# Patient Record
Sex: MALE | Race: WHITE | NOT HISPANIC OR LATINO | Employment: FULL TIME | URBAN - METROPOLITAN AREA
[De-identification: names, ages, dates, MRNs, and addresses within clinical notes are randomized per-mention and may not be internally consistent; named-entity substitution may affect disease eponyms.]

---

## 2023-10-12 ENCOUNTER — TELEPHONE (OUTPATIENT)
Dept: ENDOCRINOLOGY | Facility: CLINIC | Age: 58
End: 2023-10-12
Payer: COMMERCIAL

## 2023-10-12 NOTE — TELEPHONE ENCOUNTER
States his Cardiologist Rxd Estrella, wants to make sure you are ok with him taking it, says he is paying out of pocket.

## 2023-10-17 PROBLEM — R94.31 ABNORMAL EKG: Status: ACTIVE | Noted: 2023-10-17

## 2023-10-17 PROBLEM — E78.5 HYPERLIPIDEMIA: Status: ACTIVE | Noted: 2023-10-17

## 2023-10-17 PROBLEM — K21.9 CHRONIC GERD: Status: ACTIVE | Noted: 2023-10-17

## 2023-10-17 PROBLEM — E03.9 HYPOTHYROIDISM: Status: ACTIVE | Noted: 2023-10-17

## 2023-10-17 PROBLEM — F41.8 SITUATIONAL ANXIETY: Status: ACTIVE | Noted: 2023-10-17

## 2023-10-17 PROBLEM — Z95.1 HISTORY OF CORONARY ARTERY BYPASS GRAFT X 3: Status: ACTIVE | Noted: 2023-10-17

## 2023-10-17 PROBLEM — I10 BENIGN ESSENTIAL HYPERTENSION: Status: ACTIVE | Noted: 2023-10-17

## 2023-10-17 PROBLEM — K57.32 DIVERTICULITIS OF COLON: Status: ACTIVE | Noted: 2023-10-17

## 2023-10-17 PROBLEM — E06.3 HASHIMOTO'S THYROIDITIS: Status: ACTIVE | Noted: 2023-10-17

## 2023-10-17 PROBLEM — I25.10 CORONARY ARTERY DISEASE: Status: ACTIVE | Noted: 2023-10-17

## 2023-10-17 RX ORDER — LEVOTHYROXINE SODIUM 150 UG/1
150 TABLET ORAL DAILY
COMMUNITY
End: 2024-05-21

## 2023-10-17 RX ORDER — DULOXETIN HYDROCHLORIDE 30 MG/1
30 CAPSULE, DELAYED RELEASE ORAL DAILY
COMMUNITY
Start: 2023-01-26 | End: 2023-02-02 | Stop reason: WASHOUT

## 2023-10-17 RX ORDER — OMEPRAZOLE 20 MG/1
20 CAPSULE, DELAYED RELEASE ORAL DAILY
COMMUNITY

## 2023-10-17 RX ORDER — ATORVASTATIN CALCIUM 40 MG/1
1 TABLET, FILM COATED ORAL NIGHTLY
COMMUNITY
Start: 2022-02-22 | End: 2023-10-20 | Stop reason: SDUPTHER

## 2023-10-17 RX ORDER — EPINEPHRINE 0.22MG
100 AEROSOL WITH ADAPTER (ML) INHALATION DAILY
COMMUNITY
Start: 2021-03-02

## 2023-10-17 RX ORDER — ASPIRIN 81 MG/1
1 TABLET ORAL DAILY
COMMUNITY
Start: 2018-03-22

## 2023-10-17 RX ORDER — SEMAGLUTIDE 1.34 MG/ML
INJECTION, SOLUTION SUBCUTANEOUS
COMMUNITY
End: 2023-11-16 | Stop reason: SDUPTHER

## 2023-10-17 RX ORDER — DULOXETIN HYDROCHLORIDE 60 MG/1
60 CAPSULE, DELAYED RELEASE ORAL DAILY
COMMUNITY
Start: 2023-01-26

## 2023-10-17 RX ORDER — ALPRAZOLAM 0.5 MG/1
0.5 TABLET ORAL DAILY PRN
COMMUNITY

## 2023-10-17 RX ORDER — LOSARTAN POTASSIUM AND HYDROCHLOROTHIAZIDE 12.5; 1 MG/1; MG/1
1 TABLET ORAL EVERY 12 HOURS
COMMUNITY
End: 2023-10-20 | Stop reason: SDUPTHER

## 2023-10-17 RX ORDER — ARIPIPRAZOLE 5 MG/1
5 TABLET ORAL DAILY
COMMUNITY
Start: 2023-07-21

## 2023-10-19 PROBLEM — E66.01 MORBID OBESITY (MULTI): Status: ACTIVE | Noted: 2023-10-19

## 2023-10-19 PROBLEM — K42.0 UMBILICAL HERNIA WITH OBSTRUCTION: Status: ACTIVE | Noted: 2023-10-19

## 2023-10-19 RX ORDER — BISMUTH SUBSALICYLATE 262 MG
1 TABLET,CHEWABLE ORAL DAILY
COMMUNITY

## 2023-10-20 ENCOUNTER — OFFICE VISIT (OUTPATIENT)
Dept: CARDIOLOGY | Facility: CLINIC | Age: 58
End: 2023-10-20
Payer: COMMERCIAL

## 2023-10-20 VITALS
BODY MASS INDEX: 42.28 KG/M2 | DIASTOLIC BLOOD PRESSURE: 82 MMHG | WEIGHT: 253.8 LBS | SYSTOLIC BLOOD PRESSURE: 126 MMHG | HEIGHT: 65 IN | HEART RATE: 89 BPM

## 2023-10-20 DIAGNOSIS — E78.2 MIXED HYPERLIPIDEMIA: ICD-10-CM

## 2023-10-20 DIAGNOSIS — I25.10 CORONARY ARTERY DISEASE, UNSPECIFIED VESSEL OR LESION TYPE, UNSPECIFIED WHETHER ANGINA PRESENT, UNSPECIFIED WHETHER NATIVE OR TRANSPLANTED HEART: Primary | ICD-10-CM

## 2023-10-20 DIAGNOSIS — I10 PRIMARY HYPERTENSION: ICD-10-CM

## 2023-10-20 PROCEDURE — 1036F TOBACCO NON-USER: CPT | Performed by: INTERNAL MEDICINE

## 2023-10-20 PROCEDURE — 3079F DIAST BP 80-89 MM HG: CPT | Performed by: INTERNAL MEDICINE

## 2023-10-20 PROCEDURE — 99213 OFFICE O/P EST LOW 20 MIN: CPT | Performed by: INTERNAL MEDICINE

## 2023-10-20 PROCEDURE — 93000 ELECTROCARDIOGRAM COMPLETE: CPT | Performed by: INTERNAL MEDICINE

## 2023-10-20 PROCEDURE — 3074F SYST BP LT 130 MM HG: CPT | Performed by: INTERNAL MEDICINE

## 2023-10-20 RX ORDER — LOSARTAN POTASSIUM AND HYDROCHLOROTHIAZIDE 12.5; 1 MG/1; MG/1
1 TABLET ORAL EVERY 12 HOURS
Qty: 180 TABLET | Refills: 3 | Status: SHIPPED | OUTPATIENT
Start: 2023-10-20 | End: 2024-03-15 | Stop reason: SDUPTHER

## 2023-10-20 RX ORDER — ATORVASTATIN CALCIUM 40 MG/1
40 TABLET, FILM COATED ORAL NIGHTLY
Qty: 90 TABLET | Refills: 3 | Status: SHIPPED | OUTPATIENT
Start: 2023-10-20 | End: 2024-10-19

## 2023-10-20 NOTE — PROGRESS NOTES
Subjective   Jonathan Esquivel is a 58 y.o. male.    Chief Complaint:  Follow-up coronary artery disease.    HPI    Over the past year he has had his hernia repaired by the general surgery service here at [place].  He continues to struggle with his weight.  He has currently been started on Ozempic.  From a cardiac standpoint he has felt well.  Has had no anginal symptoms.  Does some walking but no heavy or strenuous exercises.  No increase shortness of breath or dyspnea.      Cardiac catheterization performed on 2020 demonstrated 30% left main, 90% left anterior descending the mid circumflex coronary artery was totally occluded. The right coronary artery was totally occluded. Left ventricular function was intact and normal.    He underwent coronary artery bypass grafting on 2020 with a left internal mammary graft to the anterior descending, right internal mammary graft to the marginal circumflex, and the left radial artery graft to the posterior descending branch.    His past cardiac history is significant for hypertension. Risk factors for coronary artery disease include a strong family history of premature coronary artery disease. The patient's father had coronary artery bypass grafting at the age of 51 after his first myocardial infarction. His mother  of scleroderma. He does have a history of hyperlipidemia.     Review of Systems   All other systems reviewed and are negative.      Objective   Constitutional:       Appearance: Not in distress.   Neck:      Vascular: JVD normal.   Pulmonary:      Breath sounds: Normal breath sounds.   Cardiovascular:      Normal rate. Regular rhythm. S1 with normal intensity. S2 with normal intensity.       Murmurs: There is no murmur.      No gallop.    Pulses:     Intact distal pulses.   Edema:     Peripheral edema absent.   Abdominal:      General: Bowel sounds are normal.   Neurological:      Mental Status: Alert and oriented to person, place and  time.         Assessment/Plan     1.  Coronary disease.  Status post coronary bypass grafting.  This was done in January 2020.  He had arterial graft performed.  Doing well postop.    2.  Hypertension.  Blood pressures are controlled.  Potassium 4.6, BUN 13, creatinine 1.1.  Calcium 10.6.    3.  Hyperlipidemia.  Cholesterol 143, HDL 37, LDL 78.    4.  Obesity.  Struggling with this problem.  We will have him continue Ozempic.

## 2023-11-16 DIAGNOSIS — E66.01 MORBID OBESITY (MULTI): Primary | ICD-10-CM

## 2023-11-17 DIAGNOSIS — E66.01 MORBID OBESITY (MULTI): ICD-10-CM

## 2023-11-17 RX ORDER — SEMAGLUTIDE 1.34 MG/ML
0.5 INJECTION, SOLUTION SUBCUTANEOUS
Qty: 1 EACH | Refills: 11 | Status: SHIPPED | OUTPATIENT
Start: 2023-11-17 | End: 2023-11-21

## 2023-11-21 RX ORDER — SEMAGLUTIDE 1.34 MG/ML
0.5 INJECTION, SOLUTION SUBCUTANEOUS
Qty: 4 EACH | Refills: 5 | Status: SHIPPED | OUTPATIENT
Start: 2023-11-21

## 2024-03-14 ENCOUNTER — TELEPHONE (OUTPATIENT)
Dept: CARDIOLOGY | Facility: CLINIC | Age: 59
End: 2024-03-14
Payer: COMMERCIAL

## 2024-03-14 DIAGNOSIS — I10 PRIMARY HYPERTENSION: ICD-10-CM

## 2024-03-14 NOTE — TELEPHONE ENCOUNTER
Pt called to report PCP stopped Losartan/hydrochlorothiazide 100/12.5mg twice a day due to increased kidney function.    Pt states he started Irbesartan 150mg daily on Mon. 3/12/24 per PCP. Since Monday, he has gained 5 lbs, slight increased SOB w/exertion, no edema.     Per pt, urine output has decreased. Home BP's on Losartan/hydrochlorothiazide 100/12.5mg twice a day: 130's/70-80's.     Since Monday, home BP's on Irbesartan 150mg daily: 117-125/70's.    BMP at [facility] 3/5/24: K+4.3 (4.5), B/C 15, 1.34 (13, 1.07).    Pt very concerned since Losartan/hydrochlorothiazide stopped and would like Dr. Santiago's thoughts.    Next ov 10/24/24.    Please advise. Thanks.

## 2024-03-15 RX ORDER — LOSARTAN POTASSIUM AND HYDROCHLOROTHIAZIDE 12.5; 1 MG/1; MG/1
1 TABLET ORAL DAILY
Qty: 90 TABLET | Refills: 3 | COMMUNITY
Start: 2024-03-15

## 2024-03-15 NOTE — TELEPHONE ENCOUNTER
Called pt, notified. He said that he actually restarted the losartan-hydrochlorothiazide today and already feels better, he understands that he will be taking only once daily. Med list updated.

## 2024-03-15 NOTE — TELEPHONE ENCOUNTER
Mildly abnormal renal functrion.  Take one losartan-hydrochlorothiazide 100-12.5 mg daily and stop ibersartan.

## 2024-04-08 ENCOUNTER — OFFICE VISIT (OUTPATIENT)
Dept: ENDOCRINOLOGY | Facility: CLINIC | Age: 59
End: 2024-04-08
Payer: COMMERCIAL

## 2024-04-08 VITALS — DIASTOLIC BLOOD PRESSURE: 82 MMHG | SYSTOLIC BLOOD PRESSURE: 138 MMHG | WEIGHT: 245 LBS | BODY MASS INDEX: 40.77 KG/M2

## 2024-04-08 DIAGNOSIS — I10 BENIGN ESSENTIAL HYPERTENSION: ICD-10-CM

## 2024-04-08 DIAGNOSIS — E03.9 HYPOTHYROIDISM, UNSPECIFIED TYPE: Primary | ICD-10-CM

## 2024-04-08 DIAGNOSIS — E06.3 HASHIMOTO'S THYROIDITIS: ICD-10-CM

## 2024-04-08 PROCEDURE — 3079F DIAST BP 80-89 MM HG: CPT | Performed by: INTERNAL MEDICINE

## 2024-04-08 PROCEDURE — 3075F SYST BP GE 130 - 139MM HG: CPT | Performed by: INTERNAL MEDICINE

## 2024-04-08 PROCEDURE — 99214 OFFICE O/P EST MOD 30 MIN: CPT | Performed by: INTERNAL MEDICINE

## 2024-04-08 NOTE — PROGRESS NOTES
Patient ID: Jonathan Esquivel is a 59 y.o. male who presents for Follow-up.  HPI  The patient comes in for follow up.    He has Hashimoto's hypothyroidism result hypogonadism resolved hyperlipidemia hypertension CAD post bypass insulin resistance with impaired fasting glucose.    He continues on Synthroid d.a.w. 150 mcg/day.    He continues to complain of fatigue dry skin and cold intolerance.    He has had some issues with mild renal insufficiency and his blood pressure medication was adjusted.    He has had no other change in his medical regimen.    Physically he has no other complaints.    ROS  Comprehensive review of systems is negative.    Objective   Physical Exam  Visit Vitals  /82      Vitals:    04/08/24 0813   Weight: 111 kg (245 lb)      Body mass index is 40.77 kg/m².      Weight 245 up 6 pounds    Eyes normal  ENT normal. No adenopathy  Thyroid palpable and normal. No nodules  Chest clear to auscultation  Heart sounds are normal  Abdomen nontender. Bowel sounds normal. No organomegaly  Feet are okay    Current Outpatient Medications   Medication Sig Dispense Refill    ALPRAZolam (Xanax) 0.5 mg tablet Take 1 tablet (0.5 mg) by mouth once daily as needed.      ARIPiprazole (Abilify) 5 mg tablet Take 1 tablet (5 mg) by mouth once daily.      aspirin 81 mg EC tablet Take 1 tablet (81 mg) by mouth once daily.      atorvastatin (Lipitor) 40 mg tablet Take 1 tablet (40 mg) by mouth once daily at bedtime. 90 tablet 3    coenzyme Q-10 100 mg capsule Take 1 capsule (100 mg) by mouth once daily.      DULoxetine (Cymbalta) 60 mg DR capsule Take 1 capsule (60 mg) by mouth once daily.      levothyroxine (Synthroid, Levoxyl) 150 mcg tablet Take 1 tablet (150 mcg) by mouth once daily.      losartan-hydrochlorothiazide (Hyzaar) 100-12.5 mg tablet Take 1 tablet by mouth once daily. 90 tablet 3    multivitamin tablet Take 1 tablet by mouth once daily.      omeprazole (PriLOSEC) 20 mg DR capsule Take 1 capsule (20  mg) by mouth once daily.      semaglutide (Ozempic) 0.25 mg or 0.5 mg(2 mg/1.5 mL) pen injector INJECT 0.5 MG UNDER THE SKIN 1 (ONE) TIME PER WEEK. 4 each 5     No current facility-administered medications for this visit.       Assessment/Plan     1.  Hashimoto's hypothyroidism  2.  Insulin resistance with impaired fasting glucose  3.  Hypertension  4.  Hyperlipidemia    We reviewed his blood work from March.    He will continue his current regimen.    We again discussed thyroid symptoms as well as the nonspecificity.    We again discussed variables going to thyroid hormone requirements.    He will work on diet and exercise.    He will follow-up with me in 1 year sooner as needed.

## 2024-05-21 DIAGNOSIS — E03.9 HYPOTHYROIDISM, UNSPECIFIED TYPE: Primary | ICD-10-CM

## 2024-05-21 RX ORDER — LEVOTHYROXINE SODIUM 150 UG/1
150 TABLET ORAL DAILY
Qty: 90 TABLET | Refills: 3 | Status: SHIPPED | OUTPATIENT
Start: 2024-05-21 | End: 2024-05-23

## 2024-05-23 DIAGNOSIS — E03.9 HYPOTHYROIDISM, UNSPECIFIED TYPE: ICD-10-CM

## 2024-05-23 RX ORDER — LEVOTHYROXINE SODIUM 150 UG/1
150 TABLET ORAL DAILY
Qty: 90 TABLET | Refills: 3 | Status: SHIPPED | OUTPATIENT
Start: 2024-05-23

## 2024-08-25 DIAGNOSIS — E78.2 MIXED HYPERLIPIDEMIA: ICD-10-CM

## 2024-08-26 RX ORDER — ATORVASTATIN CALCIUM 40 MG/1
40 TABLET, FILM COATED ORAL NIGHTLY
Qty: 90 TABLET | Refills: 3 | Status: SHIPPED | OUTPATIENT
Start: 2024-08-26 | End: 2025-08-26

## 2024-09-07 DIAGNOSIS — I10 PRIMARY HYPERTENSION: ICD-10-CM

## 2024-09-10 RX ORDER — LOSARTAN POTASSIUM AND HYDROCHLOROTHIAZIDE 12.5; 1 MG/1; MG/1
1 TABLET ORAL DAILY
Qty: 90 TABLET | Refills: 3 | Status: SHIPPED | OUTPATIENT
Start: 2024-09-10

## 2024-10-03 PROBLEM — E66.813 CLASS 3 SEVERE OBESITY WITH BODY MASS INDEX (BMI) OF 40.0 TO 44.9 IN ADULT: Status: ACTIVE | Noted: 2023-03-06

## 2024-10-03 PROBLEM — E66.813 OBESITY, CLASS III, BMI 40-49.9 (MORBID OBESITY): Status: ACTIVE | Noted: 2023-03-06

## 2024-10-03 PROBLEM — E66.01 OBESITY, CLASS III, BMI 40-49.9 (MORBID OBESITY) (MULTI): Status: ACTIVE | Noted: 2023-03-06

## 2024-10-03 PROBLEM — R53.83 FATIGUE: Status: ACTIVE | Noted: 2024-10-03

## 2024-10-03 PROBLEM — E66.01 MORBID OBESITY (MULTI): Status: RESOLVED | Noted: 2023-10-19 | Resolved: 2024-10-03

## 2024-10-24 ENCOUNTER — APPOINTMENT (OUTPATIENT)
Dept: CARDIOLOGY | Facility: CLINIC | Age: 59
End: 2024-10-24
Payer: COMMERCIAL

## 2024-10-24 VITALS
HEIGHT: 65 IN | OXYGEN SATURATION: 94 % | SYSTOLIC BLOOD PRESSURE: 124 MMHG | HEART RATE: 96 BPM | BODY MASS INDEX: 37.99 KG/M2 | DIASTOLIC BLOOD PRESSURE: 80 MMHG | WEIGHT: 228 LBS

## 2024-10-24 DIAGNOSIS — Z95.1 HISTORY OF CORONARY ARTERY BYPASS GRAFT X 3: ICD-10-CM

## 2024-10-24 DIAGNOSIS — I10 BENIGN ESSENTIAL HYPERTENSION: ICD-10-CM

## 2024-10-24 DIAGNOSIS — I10 PRIMARY HYPERTENSION: Primary | ICD-10-CM

## 2024-10-24 DIAGNOSIS — R94.31 ABNORMAL EKG: ICD-10-CM

## 2024-10-24 DIAGNOSIS — E78.5 HYPERLIPIDEMIA, UNSPECIFIED HYPERLIPIDEMIA TYPE: ICD-10-CM

## 2024-10-24 DIAGNOSIS — R06.09 EXERTIONAL DYSPNEA: ICD-10-CM

## 2024-10-24 DIAGNOSIS — I25.10 CORONARY ARTERY DISEASE INVOLVING NATIVE CORONARY ARTERY OF NATIVE HEART WITHOUT ANGINA PECTORIS: ICD-10-CM

## 2024-10-24 LAB
ATRIAL RATE: 79 BPM
P AXIS: 44 DEGREES
P OFFSET: 203 MS
P ONSET: 143 MS
PR INTERVAL: 164 MS
Q ONSET: 225 MS
QRS COUNT: 13 BEATS
QRS DURATION: 94 MS
QT INTERVAL: 390 MS
QTC CALCULATION(BAZETT): 447 MS
QTC FREDERICIA: 427 MS
R AXIS: 99 DEGREES
T AXIS: 31 DEGREES
T OFFSET: 420 MS
VENTRICULAR RATE: 79 BPM

## 2024-10-24 PROCEDURE — 99213 OFFICE O/P EST LOW 20 MIN: CPT | Performed by: INTERNAL MEDICINE

## 2024-10-24 PROCEDURE — 3074F SYST BP LT 130 MM HG: CPT | Performed by: INTERNAL MEDICINE

## 2024-10-24 PROCEDURE — 93005 ELECTROCARDIOGRAM TRACING: CPT | Performed by: INTERNAL MEDICINE

## 2024-10-24 PROCEDURE — 3079F DIAST BP 80-89 MM HG: CPT | Performed by: INTERNAL MEDICINE

## 2024-10-24 PROCEDURE — 1036F TOBACCO NON-USER: CPT | Performed by: INTERNAL MEDICINE

## 2024-10-24 PROCEDURE — 3008F BODY MASS INDEX DOCD: CPT | Performed by: INTERNAL MEDICINE

## 2024-10-24 RX ORDER — TIRZEPATIDE 2.5 MG/.5ML
INJECTION, SOLUTION SUBCUTANEOUS
COMMUNITY

## 2024-10-24 NOTE — PROGRESS NOTES
Subjective   Jonathan Esquivel is a 59 y.o. male.    Chief Complaint:  Follow-up coronary artery disease, coronary bypass grafting.  Exertional dyspnea.    HPI    Over the past year he has not had any typical anginal symptoms.  Continues to experience exertional dyspnea.  Has not been exercising to the level that we would like to see.  Has been losing weight.  Feels better with weight loss.    Cardiac catheterization performed on 2020 demonstrated 30% left main, 90% left anterior descending the mid circumflex coronary artery was totally occluded. The right coronary artery was totally occluded. Left ventricular function was intact and normal.     He underwent coronary artery bypass grafting on 2020 with a left internal mammary graft to the anterior descending, right internal mammary graft to the marginal circumflex, and the left radial artery graft to the posterior descending branch.     His past cardiac history is significant for hypertension. Risk factors for coronary artery disease include a strong family history of premature coronary artery disease. The patient's father had coronary artery bypass grafting at the age of 51 after his first myocardial infarction. His mother  of scleroderma. He does have a history of hyperlipidemia.      Review of Systems   All other systems reviewed and are negative.      Current Outpatient Medications   Medication Sig Dispense Refill    ALPRAZolam (Xanax) 0.5 mg tablet Take 1 tablet (0.5 mg) by mouth once daily as needed.      ARIPiprazole (Abilify) 5 mg tablet Take 1 tablet (5 mg) by mouth once daily.      aspirin 81 mg EC tablet Take 1 tablet (81 mg) by mouth once daily.      atorvastatin (Lipitor) 40 mg tablet TAKE 1 TABLET (40 MG) BY MOUTH ONCE DAILY AT BEDTIME. 90 tablet 3    coenzyme Q-10 100 mg capsule Take 1 capsule (100 mg) by mouth once daily.      levothyroxine (Synthroid, Levoxyl) 150 mcg tablet TAKE 1 TABLET BY MOUTH EVERY DAY 90 tablet 3     "losartan-hydrochlorothiazide (Hyzaar) 100-12.5 mg tablet Take 1 tablet by mouth once daily. 90 tablet 3    multivitamin tablet Take 1 tablet by mouth once daily.      omeprazole (PriLOSEC) 20 mg DR capsule Take 1 capsule (20 mg) by mouth once daily.      Zepbound 2.5 mg/0.5 mL injection INJECT 2.5 MG SUBCUTANEOUSLY WEEKLY      semaglutide (Ozempic) 0.25 mg or 0.5 mg(2 mg/1.5 mL) pen injector INJECT 0.5 MG UNDER THE SKIN 1 (ONE) TIME PER WEEK. (Patient not taking: Reported on 10/24/2024) 4 each 5     No current facility-administered medications for this visit.        Visit Vitals  /80 (BP Location: Right arm)   Pulse 96   Ht 1.651 m (5' 5\")   Wt 103 kg (228 lb)   SpO2 94%   BMI 37.94 kg/m²   Smoking Status Never   BSA 2.17 m²        Objective     Constitutional:       Appearance: Not in distress.   Neck:      Vascular: JVD normal.   Pulmonary:      Breath sounds: Normal breath sounds.   Cardiovascular:      Normal rate. Regular rhythm. S1 with normal intensity. S2 with normal intensity.       Murmurs: There is no murmur.      No gallop.    Pulses:     Intact distal pulses.   Edema:     Peripheral edema absent.   Abdominal:      General: Bowel sounds are normal.   Neurological:      Mental Status: Alert and oriented to person, place and time.         Lab Review:   Lab Results   Component Value Date     12/15/2022    K 4.6 12/15/2022     12/15/2022    CO2 30 12/15/2022    BUN 13 12/15/2022    CREATININE 1.14 12/15/2022    GLUCOSE 99 12/15/2022    CALCIUM 10.6 (H) 12/15/2022     Lab Results   Component Value Date    CHOL 213 (H) 01/23/2020    TRIG 118 01/23/2020    HDL 43.7 01/23/2020       Assessment:    1.  Coronary disease.  Status postcoronary bypass grafting with arterial grafts.  No anginal symptoms.    2.  Exertional dyspnea.  Will get echo study to evaluate left ventricular function.    3.  Hypertension.  Blood pressures are excellent.  May be able to further reduce his medications in the " future as he loses weight.    4.  Hyperlipidemia.  Elevated lipid profiles but he stopped his atorvastatin.  He has restarted it.

## 2025-04-08 ENCOUNTER — APPOINTMENT (OUTPATIENT)
Dept: ENDOCRINOLOGY | Facility: CLINIC | Age: 60
End: 2025-04-08
Payer: COMMERCIAL

## 2025-04-08 VITALS — WEIGHT: 198 LBS | DIASTOLIC BLOOD PRESSURE: 78 MMHG | BODY MASS INDEX: 32.95 KG/M2 | SYSTOLIC BLOOD PRESSURE: 124 MMHG

## 2025-04-08 DIAGNOSIS — I10 BENIGN ESSENTIAL HYPERTENSION: ICD-10-CM

## 2025-04-08 DIAGNOSIS — E03.9 HYPOTHYROIDISM, UNSPECIFIED TYPE: Primary | ICD-10-CM

## 2025-04-08 DIAGNOSIS — E06.3 HASHIMOTO'S THYROIDITIS: ICD-10-CM

## 2025-04-08 PROCEDURE — 3078F DIAST BP <80 MM HG: CPT | Performed by: INTERNAL MEDICINE

## 2025-04-08 PROCEDURE — 3074F SYST BP LT 130 MM HG: CPT | Performed by: INTERNAL MEDICINE

## 2025-04-08 PROCEDURE — 99214 OFFICE O/P EST MOD 30 MIN: CPT | Performed by: INTERNAL MEDICINE

## 2025-04-08 NOTE — PROGRESS NOTES
Patient ID: Jonathan Esquivel is a 60 y.o. male who presents for Follow-up.  HPI  The patient comes in for follow up.    He has Hashimoto's hypothyroidism result hypogonadism resolved hyperlipidemia hypertension CAD post bypass insulin resistance with impaired fasting glucose.    He continues on Synthroid d.a.w. 150 mcg/day.    He continues to complain of fatigue dry skin and cold intolerance.    He has had some issues with mild renal insufficiency and his blood pressure medication was adjusted.    His losartan HCT was switched to losartan.    He tried Ozempic and is more recently on Zepbound 5 mg which has helped.    He has had no other change in his medical regimen.    Physically he has no other complaints.    ROS  Comprehensive review of systems is negative.    Objective   Physical Exam  Visit Vitals  /78      Vitals:    04/08/25 0808   Weight: 89.8 kg (198 lb)      Body mass index is 32.95 kg/m².      Weight 198 down 47 pounds    Eyes normal  ENT normal. No adenopathy  Thyroid palpable and normal. No nodules  Chest clear to auscultation  Heart sounds are normal  Abdomen nontender. Bowel sounds normal. No organomegaly  Feet are okay  Current Outpatient Medications   Medication Sig Dispense Refill    ALPRAZolam (Xanax) 0.5 mg tablet Take 1 tablet (0.5 mg) by mouth once daily as needed.      ARIPiprazole (Abilify) 5 mg tablet Take 1 tablet (5 mg) by mouth once daily.      aspirin 81 mg EC tablet Take 1 tablet (81 mg) by mouth once daily.      atorvastatin (Lipitor) 40 mg tablet TAKE 1 TABLET (40 MG) BY MOUTH ONCE DAILY AT BEDTIME. 90 tablet 3    coenzyme Q-10 100 mg capsule Take 1 capsule (100 mg) by mouth once daily.      levothyroxine (Synthroid, Levoxyl) 150 mcg tablet TAKE 1 TABLET BY MOUTH EVERY DAY 90 tablet 3    losartan-hydrochlorothiazide (Hyzaar) 100-12.5 mg tablet Take 1 tablet by mouth once daily. 90 tablet 3    multivitamin tablet Take 1 tablet by mouth once daily.      omeprazole (PriLOSEC) 20  mg DR capsule Take 1 capsule (20 mg) by mouth once daily.      Zepbound 2.5 mg/0.5 mL injection INJECT 2.5 MG SUBCUTANEOUSLY WEEKLY       No current facility-administered medications for this visit.       Assessment/Plan     1.  Hashimoto's hypothyroidism  2.  Hypertension  3.  Hyperlipidemia    We reviewed his most recent blood work.    He will continue his current regimen.    We discussed the calcium being normal when corrected for albumin.    We discussed the possibility that the replenza supplement that he is taking may have impacted his alkaline phosphatase although the weight loss itself may have done that.    He will follow-up with his primary care doctor with this regard.    He will continue his current regimen.    He will follow-up with me in 1 year or sooner as needed.

## 2025-05-26 DIAGNOSIS — E03.9 HYPOTHYROIDISM, UNSPECIFIED TYPE: ICD-10-CM

## 2025-05-26 RX ORDER — LEVOTHYROXINE SODIUM 150 UG/1
150 TABLET ORAL DAILY
Qty: 90 TABLET | Refills: 4 | Status: SHIPPED | OUTPATIENT
Start: 2025-05-26

## 2025-10-24 ENCOUNTER — APPOINTMENT (OUTPATIENT)
Dept: CARDIOLOGY | Facility: CLINIC | Age: 60
End: 2025-10-24
Payer: COMMERCIAL

## 2026-04-08 ENCOUNTER — APPOINTMENT (OUTPATIENT)
Dept: ENDOCRINOLOGY | Facility: CLINIC | Age: 61
End: 2026-04-08
Payer: COMMERCIAL